# Patient Record
Sex: MALE | Race: BLACK OR AFRICAN AMERICAN | NOT HISPANIC OR LATINO | URBAN - METROPOLITAN AREA
[De-identification: names, ages, dates, MRNs, and addresses within clinical notes are randomized per-mention and may not be internally consistent; named-entity substitution may affect disease eponyms.]

---

## 2024-11-21 ENCOUNTER — EMERGENCY (EMERGENCY)
Facility: HOSPITAL | Age: 44
LOS: 0 days | Discharge: HOME | End: 2024-11-21
Attending: EMERGENCY MEDICINE
Payer: MEDICAID

## 2024-11-21 VITALS
WEIGHT: 145.06 LBS | RESPIRATION RATE: 18 BRPM | HEART RATE: 95 BPM | TEMPERATURE: 98 F | HEIGHT: 73 IN | SYSTOLIC BLOOD PRESSURE: 138 MMHG | DIASTOLIC BLOOD PRESSURE: 95 MMHG | OXYGEN SATURATION: 99 %

## 2024-11-21 DIAGNOSIS — R07.9 CHEST PAIN, UNSPECIFIED: ICD-10-CM

## 2024-11-21 DIAGNOSIS — R00.2 PALPITATIONS: ICD-10-CM

## 2024-11-21 DIAGNOSIS — F12.90 CANNABIS USE, UNSPECIFIED, UNCOMPLICATED: ICD-10-CM

## 2024-11-21 LAB
ALBUMIN SERPL ELPH-MCNC: 3.7 G/DL — SIGNIFICANT CHANGE UP (ref 3.3–5)
ALP SERPL-CCNC: 86 U/L — SIGNIFICANT CHANGE UP (ref 40–120)
ALT FLD-CCNC: 24 U/L — SIGNIFICANT CHANGE UP (ref 12–78)
ANION GAP SERPL CALC-SCNC: 2 MMOL/L — LOW (ref 5–17)
AST SERPL-CCNC: 17 U/L — SIGNIFICANT CHANGE UP (ref 15–37)
BASOPHILS # BLD AUTO: 0.06 K/UL — SIGNIFICANT CHANGE UP (ref 0–0.2)
BASOPHILS NFR BLD AUTO: 0.8 % — SIGNIFICANT CHANGE UP (ref 0–2)
BILIRUB SERPL-MCNC: 0.5 MG/DL — SIGNIFICANT CHANGE UP (ref 0.2–1.2)
BUN SERPL-MCNC: 13 MG/DL — SIGNIFICANT CHANGE UP (ref 7–23)
CALCIUM SERPL-MCNC: 9.1 MG/DL — SIGNIFICANT CHANGE UP (ref 8.5–10.1)
CHLORIDE SERPL-SCNC: 108 MMOL/L — SIGNIFICANT CHANGE UP (ref 96–108)
CO2 SERPL-SCNC: 29 MMOL/L — SIGNIFICANT CHANGE UP (ref 22–31)
CREAT SERPL-MCNC: 1.04 MG/DL — SIGNIFICANT CHANGE UP (ref 0.5–1.3)
EGFR: 91 ML/MIN/1.73M2 — SIGNIFICANT CHANGE UP
EOSINOPHIL # BLD AUTO: 0.09 K/UL — SIGNIFICANT CHANGE UP (ref 0–0.5)
EOSINOPHIL NFR BLD AUTO: 1.2 % — SIGNIFICANT CHANGE UP (ref 0–6)
GLUCOSE SERPL-MCNC: 87 MG/DL — SIGNIFICANT CHANGE UP (ref 70–99)
HCT VFR BLD CALC: 44.1 % — SIGNIFICANT CHANGE UP (ref 39–50)
HGB BLD-MCNC: 14.8 G/DL — SIGNIFICANT CHANGE UP (ref 13–17)
IMM GRANULOCYTES NFR BLD AUTO: 0.3 % — SIGNIFICANT CHANGE UP (ref 0–0.9)
LYMPHOCYTES # BLD AUTO: 1.59 K/UL — SIGNIFICANT CHANGE UP (ref 1–3.3)
LYMPHOCYTES # BLD AUTO: 20.4 % — SIGNIFICANT CHANGE UP (ref 13–44)
MCHC RBC-ENTMCNC: 30.3 PG — SIGNIFICANT CHANGE UP (ref 27–34)
MCHC RBC-ENTMCNC: 33.6 G/DL — SIGNIFICANT CHANGE UP (ref 32–36)
MCV RBC AUTO: 90.2 FL — SIGNIFICANT CHANGE UP (ref 80–100)
MONOCYTES # BLD AUTO: 0.57 K/UL — SIGNIFICANT CHANGE UP (ref 0–0.9)
MONOCYTES NFR BLD AUTO: 7.3 % — SIGNIFICANT CHANGE UP (ref 2–14)
NEUTROPHILS # BLD AUTO: 5.45 K/UL — SIGNIFICANT CHANGE UP (ref 1.8–7.4)
NEUTROPHILS NFR BLD AUTO: 70 % — SIGNIFICANT CHANGE UP (ref 43–77)
NRBC # BLD: 0 /100 WBCS — SIGNIFICANT CHANGE UP (ref 0–0)
PLATELET # BLD AUTO: 262 K/UL — SIGNIFICANT CHANGE UP (ref 150–400)
POTASSIUM SERPL-MCNC: 4.4 MMOL/L — SIGNIFICANT CHANGE UP (ref 3.5–5.3)
POTASSIUM SERPL-SCNC: 4.4 MMOL/L — SIGNIFICANT CHANGE UP (ref 3.5–5.3)
PROT SERPL-MCNC: 7.7 GM/DL — SIGNIFICANT CHANGE UP (ref 6–8.3)
RBC # BLD: 4.89 M/UL — SIGNIFICANT CHANGE UP (ref 4.2–5.8)
RBC # FLD: 13.1 % — SIGNIFICANT CHANGE UP (ref 10.3–14.5)
SODIUM SERPL-SCNC: 139 MMOL/L — SIGNIFICANT CHANGE UP (ref 135–145)
TROPONIN I, HIGH SENSITIVITY RESULT: 3.5 NG/L — SIGNIFICANT CHANGE UP
TSH SERPL-MCNC: 1.03 UU/ML — SIGNIFICANT CHANGE UP (ref 0.36–3.74)
WBC # BLD: 7.78 K/UL — SIGNIFICANT CHANGE UP (ref 3.8–10.5)
WBC # FLD AUTO: 7.78 K/UL — SIGNIFICANT CHANGE UP (ref 3.8–10.5)

## 2024-11-21 PROCEDURE — 71045 X-RAY EXAM CHEST 1 VIEW: CPT | Mod: 26

## 2024-11-21 PROCEDURE — 93010 ELECTROCARDIOGRAM REPORT: CPT

## 2024-11-21 PROCEDURE — 99285 EMERGENCY DEPT VISIT HI MDM: CPT

## 2024-11-21 NOTE — ED PROVIDER NOTE - ATTENDING CONTRIBUTION TO CARE
Attending note (Grant): 44-year-old male with no reported medical comorbidities complaining of episodes of palpitations associated with feeling out of breath and typically associated with stressful events; typically lasting 20-30 seconds.  Some marijuana use reported no alcohol other drug use; no caffeine intake; ECG NSR no interval abnormalities or gross isgns of ischemia; is well appearing afebrile hemodynamically stable; normal heart/lung sounds on auscultation; likely this presents anxiety/panic attack but will evaluate further in ED: cbc (to evaluate for leukocytosis or anemia), CMP (to evaluate for electrolyte abnormalities or renal/liver dysfunction), tsh (eval for hyperthyroidism; not clinically appearing to be in thyroid storm); troponin (screening for ischemic, very low suspicion). CXR to screen for ptx/consolidation/edema.  If no acute findings on labs/imaging, likely dc with outpatient cardiology f/u to have halter monitor for furhter evaluation and advised f/u with pcp, for referrals for further evaluation of anxiety/stress.    ED Course: patient's symptoms had significant improvement.  No acute actionable findings on labs. CXR shows clear lungs, gross normal cardiomediastinal border. Stable for dc, recommending further evaluation via outpatient cardiology.

## 2024-11-21 NOTE — ED ADULT NURSE NOTE - OBJECTIVE STATEMENT
patient alert and oriented x4, came in for palpitations. pt states for the past 1x month he started having intermittent chest palpitations associated with shortness of breath, pt has been having anxiety and high stressors in life. pt went to urgent care for similar complaints and nothing was found. denies nausea, vomiting, dizziness, weakness, blurred vision. denies pmh. pt OOB independent with steady gait. pt in no acute respiratory distress or discomfort at this time. fall precautions maintained. safety precautions maintained.

## 2024-11-21 NOTE — ED PROVIDER NOTE - PHYSICAL EXAMINATION
On Physical Exam:  General: well appearing, in NAD, speaking clearly in full sentences and without difficulty; cooperative with exam  HEENT: anicteric sclera, airway patent  Neck: no neck tenderness, no nuchal rigidity  Cardiac: normal s1, s2; RRR; no MGR  Lungs: CTABL  Abdomen: soft nontender/nondistended  : no bladder tenderness or distension  Skin: intact, no rash  Extremities: no peripheral edema, no gross deformities

## 2024-11-21 NOTE — ED ADULT TRIAGE NOTE - CHIEF COMPLAINT QUOTE
shortness of breath and palpitations x 1 month, on and off, was seen in urgent care last week for negative findings, admits to having a lot of stressors in life, denies pain

## 2024-11-21 NOTE — ED PROVIDER NOTE - CARE PROVIDERS DIRECT ADDRESSES
[email or web address]

## 2024-11-21 NOTE — ED PROVIDER NOTE - PATIENT PORTAL LINK FT
You can access the FollowMyHealth Patient Portal offered by [facility] by registering at the following website: [email or web address]. By joining [facility]’s FollowMyHealth portal, you will also be able to view your health information using other applications (apps) compatible with our system.

## 2024-11-21 NOTE — ED PROVIDER NOTE - CLINICAL SUMMARY MEDICAL DECISION MAKING FREE TEXT BOX
Attending note (Grant): 44-year-old male with no reported medical comorbidities complaining of episodes of palpitations associated with feeling out of breath and typically associated with stressful events; typically lasting 20-30 seconds.  Some marijuana use reported no alcohol other drug use; no caffeine intake; ECG NSR no interval abnormalities or gross isgns of ischemia; is well appearing afebrile hemodynamically stable; normal heart/lung sounds on auscultation; likely this presents anxiety/panic attack but will evaluate further in ED: cbc (to evaluate for leukocytosis or anemia), CMP (to evaluate for electrolyte abnormalities or renal/liver dysfunction), tsh (eval for hyperthyroidism; not clinically appearing to be in thyroid storm); troponin (screening ofr ischemic, very low suspicion). CXR to screen for ptx/consolidation/edema.  If no acute findings on labs/imaging, likely dc with outpatient cardiology f/u to have halter monitor for furhter evaluation and advised f/u with pcp, for referrals for further evaluation of anxiety/stress. Attending note (Grant): 44-year-old male with no reported medical comorbidities complaining of episodes of palpitations associated with feeling out of breath and typically associated with stressful events; typically lasting 20-30 seconds.  Some marijuana use reported no alcohol other drug use; no caffeine intake; ECG NSR no interval abnormalities or gross isgns of ischemia; is well appearing afebrile hemodynamically stable; normal heart/lung sounds on auscultation; likely this presents anxiety/panic attack but will evaluate further in ED: cbc (to evaluate for leukocytosis or anemia), CMP (to evaluate for electrolyte abnormalities or renal/liver dysfunction), tsh (eval for hyperthyroidism; not clinically appearing to be in thyroid storm); troponin (screening ofr ischemic, very low suspicion). CXR to screen for ptx/consolidation/edema.  If no acute findings on labs/imaging, likely dc with outpatient cardiology f/u to have halter monitor for furhter evaluation and advised f/u with pcp, for referrals for further evaluation of anxiety/stress.    RW MDM Yan 1454: Patient's pain improved. Labs nonactionable. CXR clear. Will discharge with cardiology follow-up. All questions answered.

## 2024-11-21 NOTE — ED PROVIDER NOTE - NSFOLLOWUPINSTRUCTIONS_ED_ALL_ED_FT
You were seen in the emergency department for chest pain.  Your results are attached.    Contact information is also attached for cardiology.    Return to the emergency department new or worsening symptoms.

## 2024-11-21 NOTE — ED PROVIDER NOTE - CARE PROVIDER_API CALL
Hai Weaver  [facility]  [address] [place] [address], [address]  [place] [address]  Phone: [phone]  Fax: [phone]  Follow Up Time:     Bhavesh Ceballos  Cardiovascular Disease  [address] [place] [address]  Phone: [phone]  Fax: [phone]  Follow Up Time:

## 2024-11-21 NOTE — ED PROVIDER NOTE - OBJECTIVE STATEMENT
Attending note (Grant): 44-year-old male with no reported medical comorbidities complaining of episodes of palpitations associated with feeling out of breath and typically associated with stressful events; typically lasting 20-30 seconds.  Patient reports had episode last night while riding bus when there were a lot of people being loud on the bus.  Also reports having another episode this morning in setting of feeling stressed about having to move his family member who is a hoarder.  No chest pain and currently while in ED has no shortness of breath or palpitations.  No fever no cough no congestion.  No leg swelling no recent travel no history of exogenous hormone use no history DVT PE malignancy.  No family history of early cardiac disease.  States has been having similar symptoms ongoing for the past month.  smokes marijuana occasionally; no recent alcohol or drug use.